# Patient Record
Sex: FEMALE | Race: BLACK OR AFRICAN AMERICAN | URBAN - METROPOLITAN AREA
[De-identification: names, ages, dates, MRNs, and addresses within clinical notes are randomized per-mention and may not be internally consistent; named-entity substitution may affect disease eponyms.]

---

## 2019-12-29 ENCOUNTER — EMERGENCY (EMERGENCY)
Facility: HOSPITAL | Age: 34
LOS: 0 days | Discharge: HOME | End: 2019-12-29
Admitting: EMERGENCY MEDICINE
Payer: OTHER MISCELLANEOUS

## 2019-12-29 VITALS
HEART RATE: 62 BPM | RESPIRATION RATE: 18 BRPM | OXYGEN SATURATION: 99 % | SYSTOLIC BLOOD PRESSURE: 136 MMHG | TEMPERATURE: 98 F | DIASTOLIC BLOOD PRESSURE: 60 MMHG
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
## 2019-12-29 DIAGNOSIS — S61.232A PUNCTURE WOUND WITHOUT FOREIGN BODY OF RIGHT MIDDLE FINGER WITHOUT DAMAGE TO NAIL, INITIAL ENCOUNTER: ICD-10-CM
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
## 2019-12-29 PROCEDURE — 99284 EMERGENCY DEPT VISIT MOD MDM: CPT

## 2019-12-29 NOTE — ED PROVIDER NOTE - PROGRESS NOTE DETAILS
Spoke to 3A charge RN notified medicine team to draw labs on source patient. Reviewed source patient medical record no hx HIV/ Hepatitis hx. Low risk no pep started. Follow-up employee health tomorrow.

## 2019-12-29 NOTE — ED PROVIDER NOTE - OBJECTIVE STATEMENT
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 He left the stylet on the table and I was planning to throw it out and I forgot it was on the table. I accidentally stuck my right pinky with it. I immediately washes my pinky. All my immunizations are UTD since I just started working here." no other complaints

## 2019-12-29 NOTE — ED PROVIDER NOTE - PATIENT PORTAL LINK FT
You can access the FollowMyHealth Patient Portal offered by [facility] by registering at the following website: [email or web address]. By joining [employer]’s FollowMyHealth portal, you will also be able to view your health information using other applications (apps) compatible with our system.

## 2019-12-29 NOTE — ED ADULT NURSE NOTE - NSIMPLEMENTINTERV_GEN_ALL_ED
Implemented All Universal Safety Interventions:  [place] to call system. Call bell, personal items and telephone within reach. Instruct patient to call for assistance. Room bathroom lighting operational. Non-slip footwear when patient is off stretcher. Physically safe environment: no spills, clutter or unnecessary equipment. Stretcher in lowest position, wheels locked, appropriate side rails in place.

## 2019-12-29 NOTE — ED PROVIDER NOTE - NSFOLLOWUPINSTRUCTIONS_ED_ALL_ED_FT
Body Fluid Exposure Information    People may come into contact with blood and other body fluids under various circumstances. In some cases, body fluids may contain germs (bacteria or viruses) that cause infections. These germs can be spread when an infected person’s body fluids come into contact with the mouth, nose, eyes, genitals, or broken skin of another person. Broken skin includes skin that has been opened by cuts, abrasions, dermatitis, or chapped skin.    Exposure to infected body fluids is a common risk for health care workers and family members who care for sick people. Other common methods of exposure include injection drug use, sharing needles, and sexual activity.    The risk of an infection spreading through body fluid exposure is small and depends on a variety of factors. These include the type of body fluid, the nature of the exposure, and the health status of the person who was the source of the body fluids. Your health care provider can help you assess the risk.    Prevention is the first defense against body fluid exposure.    What types of body fluids can spread infection?  The following body fluids have the potential to spread infections:    Blood.  Semen.  Vaginal secretions.  Urine.  Feces.  Saliva.  Nasal or eye discharge.  Breast milk.  Amniotic fluid.  Fluids surrounding body organs.    What are some first-aid measures for body fluid exposure?  The following steps should be taken as soon as possible after a person is exposed to body fluids:    Intact skin     For contact with closed skin, wash the area with soap and water.    Broken skin     For contact with broken skin:    Let the area bleed a little.  Wash well with soap and water. If soap is not available, use just water or hand .  Place a bandage or clean towel on the wound and apply gentle pressure to stop the bleeding. Do not squeeze or rub the area.    Do not use harsh chemicals such as bleach or iodine.    Eyes     Rinse the eyes with water or saline for 30 seconds or longer.  If the person is wearing contact lenses, leave the contact lenses in while rinsing the eyes. After the rinsing is complete, remove the contact lenses.    Mouth     Spit out the fluids. Rinse with water 4–5 times, spitting it out each time.    In addition, you should remove any clothing that comes into contact with body fluids. However, if you came into contact with the fluids as a result of sexual assault, seek medical care immediately. Do not shower, take a bath, or change clothes until police collect evidence of sexual assault from your body and your clothes.    When should I seek help?  After performing the proper first-aid steps:    You should call your health care provider or seek emergency care right away if blood or other body fluids made contact with areas of broken skin or openings such as the eyes, nose, or mouth.  If the exposure to body fluid happened in the workplace, you should report it to your work supervisor immediately. Many workplaces have procedures in place for exposure situations.    What will happen after I report the exposure?  Your health care provider will ask you several questions. Information requested may include:    Your medical history, including vaccination records.  Date and time of the exposure.  Whether you saw body fluids during the exposure.  Type of body fluid you were exposed to.  Volume of body fluid you were exposed to.  How the exposure happened.  If any devices, such as needles, were being used.  Which area of your body made contact with the body fluid.  Description of any injury to the skin or other area.  How long contact was made with the body fluid.  The health status of the person whose body fluid you were exposed to, if known.    Your health care provider will assess your risk for infection. Often, no treatment is necessary. However, in some cases:    Your health care provider may recommend doing blood tests right away.  Follow-up blood tests may also be done at certain intervals during the upcoming weeks and months to check for any changes.  You may be offered treatment to prevent an infection from developing after exposure (post-exposure prophylaxis). This may include certain vaccinations or medicines and may be necessary when there is a risk of a serious infection, such as HIV (human immunodeficiency virus) or hepatitis B. Your health care provider will discuss appropriate treatment and vaccinations with you.    How can I prevent exposure and infection?  To help prevent exposure to body fluids:    Wash and disinfect countertops and other surfaces regularly.  Wear appropriate protective gear such as gloves, gowns, masks, or eyewear when the possibility of exposure is present.  Wipe away spills of body fluid with disposable towels.  Properly dispose of blood products and other fluids. Use secured bags.  Properly dispose of needles and other instruments with sharp points or edges (sharps). Use closed, marked containers.  Avoid injection drug use.  Do not share needles.  Avoid recapping needles.  Use a condom during sexual intercourse.  Learn and follow any guidelines for preventing exposure (universal precautions) provided at your workplace.    To help reduce your chances of getting an infection:    Make sure your vaccinations are up-to-date, including vaccinations for tetanus and hepatitis.  Wash your hands frequently with soap and water. If water and soap are not available, use hand .  Avoid having multiple sexual partners.  Keep all follow-up visits as told by your health care provider. This is important because you will need to be monitored after you are evaluated for exposure to body fluids.    To avoid spreading infection to others:    Do not have sexual relations until you know you are free of infection.  Do not donate blood, plasma, breast milk, sperm, or other body fluids.  Do not share hygiene items such as toothbrushes, razors, or dental floss.  Keep open wounds covered.  Dispose of any items with blood on them (razors, tampons, bandages) by putting them in the trash.  Do not share drug supplies, such as needles, syringes, straws, or pipes, with others.  Follow all instructions from your health care provider for preventing the spread of infection.    Follow-up with employee health tomorrow.

## 2019-12-29 NOTE — ED ADULT NURSE NOTE - OBJECTIVE STATEMENT
pt her c/o needlestick. pt reports pt refused to use hosptial lancets. pt used his own and pca was going to bring to sharps and was stuck. washed immediately after.

## 2020-01-31 ENCOUNTER — EMERGENCY (EMERGENCY)
Facility: HOSPITAL | Age: 35
LOS: 0 days | Discharge: HOME | End: 2020-01-31
Attending: EMERGENCY MEDICINE | Admitting: EMERGENCY MEDICINE
Payer: MEDICAID

## 2020-01-31 VITALS
SYSTOLIC BLOOD PRESSURE: 148 MMHG | TEMPERATURE: 97 F | RESPIRATION RATE: 22 BRPM | OXYGEN SATURATION: 100 % | DIASTOLIC BLOOD PRESSURE: 83 MMHG | HEART RATE: 65 BPM

## 2020-01-31 VITALS
HEART RATE: 67 BPM | TEMPERATURE: 99 F | RESPIRATION RATE: 18 BRPM | DIASTOLIC BLOOD PRESSURE: 85 MMHG | SYSTOLIC BLOOD PRESSURE: 166 MMHG | OXYGEN SATURATION: 100 %

## 2020-01-31 DIAGNOSIS — R51 HEADACHE: ICD-10-CM

## 2020-01-31 DIAGNOSIS — R09.81 NASAL CONGESTION: ICD-10-CM

## 2020-01-31 LAB
ALBUMIN SERPL ELPH-MCNC: 4.6 G/DL — SIGNIFICANT CHANGE UP (ref 3.5–5.2)
ALP SERPL-CCNC: 48 U/L — SIGNIFICANT CHANGE UP (ref 30–115)
ALT FLD-CCNC: 10 U/L — SIGNIFICANT CHANGE UP (ref 0–41)
ANION GAP SERPL CALC-SCNC: 14 MMOL/L — SIGNIFICANT CHANGE UP (ref 7–14)
AST SERPL-CCNC: 26 U/L — SIGNIFICANT CHANGE UP (ref 0–41)
BASOPHILS # BLD AUTO: 0.02 K/UL — SIGNIFICANT CHANGE UP (ref 0–0.2)
BASOPHILS NFR BLD AUTO: 0.3 % — SIGNIFICANT CHANGE UP (ref 0–1)
BILIRUB SERPL-MCNC: 0.2 MG/DL — SIGNIFICANT CHANGE UP (ref 0.2–1.2)
BUN SERPL-MCNC: 10 MG/DL — SIGNIFICANT CHANGE UP (ref 10–20)
CALCIUM SERPL-MCNC: 9.4 MG/DL — SIGNIFICANT CHANGE UP (ref 8.5–10.1)
CHLORIDE SERPL-SCNC: 103 MMOL/L — SIGNIFICANT CHANGE UP (ref 98–110)
CO2 SERPL-SCNC: 21 MMOL/L — SIGNIFICANT CHANGE UP (ref 17–32)
CREAT SERPL-MCNC: 0.7 MG/DL — SIGNIFICANT CHANGE UP (ref 0.7–1.5)
EOSINOPHIL # BLD AUTO: 0.05 K/UL — SIGNIFICANT CHANGE UP (ref 0–0.7)
EOSINOPHIL NFR BLD AUTO: 0.8 % — SIGNIFICANT CHANGE UP (ref 0–8)
GLUCOSE SERPL-MCNC: 92 MG/DL — SIGNIFICANT CHANGE UP (ref 70–99)
HCT VFR BLD CALC: 33.4 % — LOW (ref 37–47)
HGB BLD-MCNC: 10.8 G/DL — LOW (ref 12–16)
IMM GRANULOCYTES NFR BLD AUTO: 0.2 % — SIGNIFICANT CHANGE UP (ref 0.1–0.3)
LYMPHOCYTES # BLD AUTO: 2.52 K/UL — SIGNIFICANT CHANGE UP (ref 1.2–3.4)
LYMPHOCYTES # BLD AUTO: 42 % — SIGNIFICANT CHANGE UP (ref 20.5–51.1)
MAGNESIUM SERPL-MCNC: 2.1 MG/DL — SIGNIFICANT CHANGE UP (ref 1.8–2.4)
MCHC RBC-ENTMCNC: 28.2 PG — SIGNIFICANT CHANGE UP (ref 27–31)
MCHC RBC-ENTMCNC: 32.3 G/DL — SIGNIFICANT CHANGE UP (ref 32–37)
MCV RBC AUTO: 87.2 FL — SIGNIFICANT CHANGE UP (ref 81–99)
MONOCYTES # BLD AUTO: 0.48 K/UL — SIGNIFICANT CHANGE UP (ref 0.1–0.6)
MONOCYTES NFR BLD AUTO: 8 % — SIGNIFICANT CHANGE UP (ref 1.7–9.3)
NEUTROPHILS # BLD AUTO: 2.92 K/UL — SIGNIFICANT CHANGE UP (ref 1.4–6.5)
NEUTROPHILS NFR BLD AUTO: 48.7 % — SIGNIFICANT CHANGE UP (ref 42.2–75.2)
NRBC # BLD: 0 /100 WBCS — SIGNIFICANT CHANGE UP (ref 0–0)
PLATELET # BLD AUTO: 279 K/UL — SIGNIFICANT CHANGE UP (ref 130–400)
POTASSIUM SERPL-MCNC: 4.6 MMOL/L — SIGNIFICANT CHANGE UP (ref 3.5–5)
POTASSIUM SERPL-SCNC: 4.6 MMOL/L — SIGNIFICANT CHANGE UP (ref 3.5–5)
PROT SERPL-MCNC: 7.5 G/DL — SIGNIFICANT CHANGE UP (ref 6–8)
RBC # BLD: 3.83 M/UL — LOW (ref 4.2–5.4)
RBC # FLD: 13 % — SIGNIFICANT CHANGE UP (ref 11.5–14.5)
SODIUM SERPL-SCNC: 138 MMOL/L — SIGNIFICANT CHANGE UP (ref 135–146)
TROPONIN T SERPL-MCNC: <0.01 NG/ML — SIGNIFICANT CHANGE UP
WBC # BLD: 6 K/UL — SIGNIFICANT CHANGE UP (ref 4.8–10.8)
WBC # FLD AUTO: 6 K/UL — SIGNIFICANT CHANGE UP (ref 4.8–10.8)

## 2020-01-31 PROCEDURE — 93010 ELECTROCARDIOGRAM REPORT: CPT

## 2020-01-31 PROCEDURE — 99285 EMERGENCY DEPT VISIT HI MDM: CPT

## 2020-01-31 PROCEDURE — 71046 X-RAY EXAM CHEST 2 VIEWS: CPT | Mod: 26

## 2020-01-31 RX ORDER — ACETAMINOPHEN 500 MG
975 TABLET ORAL ONCE
Refills: 0 | Status: COMPLETED | OUTPATIENT
Start: 2020-01-31 | End: 2020-01-31

## 2020-01-31 RX ORDER — METOCLOPRAMIDE HCL 10 MG
10 TABLET ORAL ONCE
Refills: 0 | Status: COMPLETED | OUTPATIENT
Start: 2020-01-31 | End: 2020-01-31

## 2020-01-31 RX ORDER — SODIUM CHLORIDE 9 MG/ML
1000 INJECTION, SOLUTION INTRAVENOUS ONCE
Refills: 0 | Status: COMPLETED | OUTPATIENT
Start: 2020-01-31 | End: 2020-01-31

## 2020-01-31 RX ADMIN — Medication 975 MILLIGRAM(S): at 16:35

## 2020-01-31 RX ADMIN — SODIUM CHLORIDE 1000 MILLILITER(S): 9 INJECTION, SOLUTION INTRAVENOUS at 17:34

## 2020-01-31 RX ADMIN — Medication 10 MILLIGRAM(S): at 17:00

## 2020-01-31 RX ADMIN — SODIUM CHLORIDE 2000 MILLILITER(S): 9 INJECTION, SOLUTION INTRAVENOUS at 16:35

## 2020-01-31 RX ADMIN — Medication 104 MILLIGRAM(S): at 16:35

## 2020-01-31 RX ADMIN — Medication 975 MILLIGRAM(S): at 17:00

## 2020-01-31 NOTE — ED PROVIDER NOTE - PATIENT PORTAL LINK FT
You can access the FollowMyHealth Patient Portal offered by [facility] by registering at the following website: [email or web address]. By joining [email or web address]’s FollowMyHealth portal, you will also be able to view your health information using other applications (apps) compatible with our system.

## 2020-01-31 NOTE — ED ADULT NURSE NOTE - OBJECTIVE STATEMENT
Patient c/o right sided head pain and SOB x 3 days. Patient states this morning pain increased. +Dizziness, chills, sensitivity to light. On assessment PERRL. Denies any n/v/f/d at this time. No obvious signs of distress noted. Pulse ox-100% on room air, B/L Lungs clear.

## 2020-01-31 NOTE — ED PROVIDER NOTE - OBJECTIVE STATEMENT
Patient is a 34 year old female with a non significant pmh with a cc of headache and SOB. She says that the headache has been happening for 2 weeks. It has recently gotten worse stating that it was a 6/10 on pain scale and recently become a 8/10 with sudden right sided facial pain. Pain is described as sharp and radiates down to her back. She uses motrin to alleviate the pain and only slightly makes it better. Bending over makes it worse.    She also describes SOB that started 3 days ago that is consistent and is progressivley getting worse. Only lifting things makes it worse and sleeping makes it better. Symptoms do not change with position. Denies recent travel, OCP use, or leg swelling. Patient is a 34 year old female with a non significant pmh with a cc of headache and SOB. She says that the headache has been happening for 2 weeks. It has recently gotten worse stating that it was a 6/10 on pain scale and recently become a 8/10 with sudden right sided facial pain. Pain is described as sharp and radiates down to her back. She uses motrin to alleviate the pain and only slightly makes it better. Bending over makes it worse.

## 2020-01-31 NOTE — ED PROVIDER NOTE - PHYSICAL EXAMINATION
CONST: well appearing for age  HEAD:  normocephalic, atraumatic  EYES:  conjunctivae without injection, drainage or discharge  CARDIAC:  regular rate and rhythm, normal S1 and S2, no murmurs, rubs or gallops  RESP:  respiratory rate and effort appear normal for age; lungs are clear to auscultation bilaterally; no rales or wheezes  ABDOMEN:  soft, nontender, nondistended, no masses, no organomegaly  LYMPHATICS:  no significant lymphadenopathy  MUSCULOSKELETAL/NEURO:  normal movement, normal tone  SKIN:  normal skin color for age and race, well-perfused; warm and dry

## 2020-01-31 NOTE — ED PROVIDER NOTE - NS ED ROS FT
Review of Systems:  •	CONSTITUTIONAL - No fever, No diaphoresis, No weight change  •	SKIN - No rash  •	EYES - No eye pain, No blurred vision  •	RESPIRATORY - hortness of breath, No cough  •	CARDIAC -No chest pain, No palpitations  •	GI - No abdominal pain, No nausea, No vomiting, No diarrhea, No constipation, No bright red blood per rectum or melena. No flank pain  •                 - No dysuria, frequency, hematuria.   •	ENDO - No polydypsia, No polyuria, No heat/cold intolerance  •	MUSCULOSKELETAL - No joint paint, No swelling, No back pain  •	NEUROLOGIC - No numbness, No focal weakness, No dizziness, headache  All other systems negative, unless specified in HPI Review of Systems:  •	CONSTITUTIONAL - No fever, No diaphoresis, No weight change  •	SKIN - No rash  •	EYES - No eye pain, No blurred vision  •	RESPIRATORY - shortness of breath, No cough  •	CARDIAC -No chest pain, No palpitations  •	GI - No abdominal pain, No nausea, No vomiting, No diarrhea, No constipation, No bright red blood per rectum or melena. No flank pain  •                 - No dysuria, frequency, hematuria.   •	ENDO - No polydypsia, No polyuria, No heat/cold intolerance  •	MUSCULOSKELETAL - No joint paint, No swelling, No back pain  •	NEUROLOGIC - No numbness, No focal weakness, No dizziness, headache  All other systems negative, unless specified in HPI

## 2020-01-31 NOTE — ED PROVIDER NOTE - PLAN OF CARE
Assessment:  Patient is a 34 year old female with a non significant PMH with a cc of headache and SOB.

## 2020-01-31 NOTE — ED PROVIDER NOTE - CARE PLAN
Assessment and plan of treatment:	Assessment:  Patient is a 34 year old female with a non significant PMH with a cc of headache and SOB.

## 2020-01-31 NOTE — ED ADULT NURSE NOTE - NSIMPLEMENTINTERV_GEN_ALL_ED
Implemented All Universal Safety Interventions:  [place] to call system. Call bell, personal items and telephone within reach. Instruct patient to call for assistance. Room bathroom lighting operational. Non-slip footwear when patient is off stretcher. Physically safe environment: no spills, clutter or unnecessary equipment. Stretcher in lowest position, wheels locked, appropriate side rails in place.

## 2020-01-31 NOTE — ED PROVIDER NOTE - CLINICAL SUMMARY MEDICAL DECISION MAKING FREE TEXT BOX
patient presents with headache, facial pain and nasal congestion. labs, ekg, cxr done. exam consistent with sinus infection. Patient feeling better after medications. Will discharge with antibiotics and ENT follow up. Return precautions discussed.

## 2020-01-31 NOTE — ED PROVIDER NOTE - NSFOLLOWUPINSTRUCTIONS_ED_ALL_ED_FT
Please follow up with your primary care physician and ENT in 1-2 days.     Headache    A headache is pain or discomfort felt around the head or neck area. The specific cause of a headache may not be found as there are many types including tension headaches, migraine headaches, and cluster headaches. Watch your condition for any changes. Things you can do to manage your pain include taking over the counter and prescription medications as instructed by your health care provider, lying down in a dark quiet room, limiting stress, getting regular sleep, and refraining from alcohol and tobacco products.    SEEK IMMEDIATE MEDICAL CARE IF YOU HAVE ANY OF THE FOLLOWING SYMPTOMS: fever, vomiting, stiff neck, loss of vision, problems with speech, muscle weakness, loss of balance, trouble walking, passing out, or confusion.

## 2020-01-31 NOTE — ED PROVIDER NOTE - CARE PROVIDER_API CALL
Juma Connell)  Otolaryngology  [address] [place] [address], [address] [place] [address]  Phone: [phone]  Fax: [phone]  Follow Up Time:

## 2020-01-31 NOTE — ED PROVIDER NOTE - ATTENDING CONTRIBUTION TO CARE
35 yo F no pmh presents with headache and shortness of breath. States that for the last 2 weeks she has been having intermittent headache, right side, assocaited to the front of her face. States that for the last 3 days she also reports some shortness of breath. no cp, no palpitations. no leg swelling or recent travel. no ocp use, no hx of smoking. Also having runny nose that started today. no fevers. Reports a URI 3 weeks ago that resolved.     CONSTITUTIONAL: Well-developed; well-nourished; in no acute distress.   SKIN: warm, dry  HEAD: Normocephalic; atraumatic.  EYES: PERRL, EOMI, no conjunctival erythema  ENT: + right sided maxillary facial tenderness. + nasal congestion.   NECK: Supple; non tender.  CARD: S1, S2 normal;  Regular rate and rhythm.   RESP: No wheezes, rales or rhonchi. speaking full sentences, no retractions. good air movement.   ABD: soft non tender, non distended, no rebound or guarding  EXT: Normal ROM.    LYMPH: No acute cervical adenopathy.  NEURO: Alert, oriented, grossly unremarkable.   PSYCH: Cooperative, appropriate.

## 2020-04-26 ENCOUNTER — MESSAGE (OUTPATIENT)
Age: 35
End: 2020-04-26

## 2020-11-09 PROBLEM — Z00.00 ENCOUNTER FOR PREVENTIVE HEALTH EXAMINATION: Status: ACTIVE | Noted: 2020-11-09

## 2021-02-16 ENCOUNTER — EMERGENCY (EMERGENCY)
Facility: HOSPITAL | Age: 36
LOS: 0 days | Discharge: HOME | End: 2021-02-16
Attending: EMERGENCY MEDICINE | Admitting: EMERGENCY MEDICINE
Payer: COMMERCIAL

## 2021-02-16 VITALS
HEIGHT: 65 IN | HEART RATE: 69 BPM | OXYGEN SATURATION: 99 % | SYSTOLIC BLOOD PRESSURE: 150 MMHG | DIASTOLIC BLOOD PRESSURE: 90 MMHG | WEIGHT: 175.05 LBS | RESPIRATION RATE: 18 BRPM | TEMPERATURE: 98 F
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
## 2021-02-16 DIAGNOSIS — S61.233A PUNCTURE WOUND WITHOUT FOREIGN BODY OF LEFT MIDDLE FINGER WITHOUT DAMAGE TO NAIL, INITIAL ENCOUNTER: ICD-10-CM
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
## 2021-02-16 PROCEDURE — 99284 EMERGENCY DEPT VISIT MOD MDM: CPT

## 2021-02-16 NOTE — ED ADULT TRIAGE NOTE - NS ED NURSE BANDS TYPE
Cj Melgar,  It doesn't look like Karyn has an aspirin prescribed or that it is intentionally not prescribed per her medication list. Would you like her to begin taking an daily aspirin or note that it is intentionally not prescribed on her med list?  Thanks! Debra Name band;

## 2021-02-16 NOTE — ED PROVIDER NOTE - PHYSICAL EXAMINATION
VITAL SIGNS: I have reviewed nursing notes and confirm.  CONSTITUTIONAL: Well-developed; well-nourished; in no acute distress.  SKIN: Skin exam is warm and dry, no acute rash. (+) small superficial puncture wound to left 3rd MCP  EXT: Normal ROM.   NEURO: Alert, oriented. Grossly unremarkable. No focal deficits.

## 2021-02-16 NOTE — ED PROVIDER NOTE - NSFOLLOWUPINSTRUCTIONS_ED_ALL_ED_FT
Needlestick and Sharps Injury  ImageA needlestick injury happens when a person gets poked (stuck) by a needle or sharp tool (sharps) that may have someone else's blood on it. A needlestick injury can happen to a health care worker, or to anyone who is exposed to needles. The injury may expose you to blood that carries infections such as:  Hepatitis B.  Hepatitis C.  Human immunodeficiency virus (HIV).  If you have a needle stick injury or think you may have been exposed to blood or body fluids:  Wash the injured area right away with soap and water.  Place a bandage or clean towel on the wound and apply gentle pressure to stop the bleeding. Do not squeeze or rub the area.  Notify a work place supervisor or doctor. Follow any procedures in your work place.  Tell your doctor if you are pregnant or breastfeeding.  Treatments may include:  Blood tests to make sure that you have no infection.  Tetanus shot.  Hepatitis B shot.  Medicines to stop or treat infection.  Treatment for the wound.  Follow these instructions at home:  Wound care     There are many ways to close and cover a wound. For example, a wound can be covered with sutures, skin glue, or adhesive strips. Follow instructions from your doctor about:  How to take care of your wound.  When and how you should change your bandage (dressing).  Keep the bandage dry as told by your doctor.   Do not take baths, swim, use a hot tub, or do anything that would put your wound underwater until your doctor approves.  Check your wound every day for signs of infection. Check for:  Redness, swelling, or pain.  Fluid or blood.  Pus or a bad smell.  Warmth.  General instructions     Take over-the-counter and prescription medicines only as told by your doctor.  If you were prescribed an antibiotic medicine, take it as told by your doctor. Do not stop using the antibiotic even if you start to feel better.  Keep all follow-up visits as told by your doctor. This is important.  Contact a doctor if:  The poked area is red, swollen, or painful.  You have a fever.  You feel worried (anxious), mad, or sad (depressed).  You have trouble sleeping.  Your skin or the whites of your eyes look yellow (jaundice).  You have belly pain or a feeling of fullness.  You have tiredness (fatigue).  You feel sickness in a lot of your body (malaise).  You get infections often.  Summary  A needlestick injury happens when a person gets poked (stuck) by a needle that may have someone else's blood on it.  It is treated by cleaning the injured area right away with soap and water. You may get tetanus and hepatitis B shots. You may also get medicines for infections.  Take medicine and care for your wound as told by your doctor.  This information is not intended to replace advice given to you by your health care provider. Make sure you discuss any questions you have with your health care provider. Needlestick and Sharps Injury    A needlestick injury happens when a person gets poked (stuck) by a needle or sharp tool (sharps) that may have someone else's blood on it. A needlestick injury can happen to a health care worker, or to anyone who is exposed to needles. The injury may expose you to blood that carries infections such as:  Hepatitis B.  Hepatitis C.  Human immunodeficiency virus (HIV).  If you have a needle stick injury or think you may have been exposed to blood or body fluids:  Wash the injured area right away with soap and water.  Place a bandage or clean towel on the wound and apply gentle pressure to stop the bleeding. Do not squeeze or rub the area.  Notify a work place supervisor or doctor. Follow any procedures in your work place.  Tell your doctor if you are pregnant or breastfeeding.  Treatments may include:  Blood tests to make sure that you have no infection.  Tetanus shot.  Hepatitis B shot.  Medicines to stop or treat infection.  Treatment for the wound.  Follow these instructions at home:  Wound care     There are many ways to close and cover a wound. For example, a wound can be covered with sutures, skin glue, or adhesive strips. Follow instructions from your doctor about:  How to take care of your wound.  When and how you should change your bandage (dressing).  Keep the bandage dry as told by your doctor.   Do not take baths, swim, use a hot tub, or do anything that would put your wound underwater until your doctor approves.  Check your wound every day for signs of infection. Check for:  Redness, swelling, or pain.  Fluid or blood.  Pus or a bad smell.  Warmth.  General instructions     Take over-the-counter and prescription medicines only as told by your doctor.  If you were prescribed an antibiotic medicine, take it as told by your doctor. Do not stop using the antibiotic even if you start to feel better.  Keep all follow-up visits as told by your doctor. This is important.  Contact a doctor if:  The poked area is red, swollen, or painful.  You have a fever.  You feel worried (anxious), mad, or sad (depressed).  You have trouble sleeping.  Your skin or the whites of your eyes look yellow (jaundice).  You have belly pain or a feeling of fullness.  You have tiredness (fatigue).  You feel sickness in a lot of your body (malaise).  You get infections often.  Summary  A needlestick injury happens when a person gets poked (stuck) by a needle that may have someone else's blood on it.  It is treated by cleaning the injured area right away with soap and water. You may get tetanus and hepatitis B shots. You may also get medicines for infections.  Take medicine and care for your wound as told by your doctor.  This information is not intended to replace advice given to you by your health care provider. Make sure you discuss any questions you have with your health care provider.

## 2021-02-16 NOTE — ED PROVIDER NOTE - PATIENT PORTAL LINK FT
You can access the FollowMyHealth Patient Portal offered by [facility] by registering at the following website: [email or web address]. By joining [employer]’s FollowMyHealth portal, you will also be able to view your health information using other applications (apps) compatible with our system.

## 2021-02-16 NOTE — ED PROVIDER NOTE - OBJECTIVE STATEMENT
36 yo F with no significant PMHx presents to the ED c/o needle stick injury to left 3rd finger. 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 Pt was agitated and IV needle punctured him and then stuck pt. Pt denies other areas of injury. She washed area immediately. She denies numbness, weakness.

## 2021-02-16 NOTE — ED ADULT NURSE NOTE - OBJECTIVE STATEMENT
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  employee exposure kit completed; labs sent

## 2021-02-16 NOTE — ED PROVIDER NOTE - CLINICAL SUMMARY MEDICAL DECISION MAKING FREE TEXT BOX
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 Immediately washed and milked the digit.     Patient declined ppx pending contact patient's lab results. Baseline labs sent, will dc home, advised to follow up with manager and in employee health.

## 2021-03-09 ENCOUNTER — RESULT REVIEW (OUTPATIENT)
Age: 36
End: 2021-03-09

## 2021-05-24 ENCOUNTER — OUTPATIENT (OUTPATIENT)
Dept: OUTPATIENT SERVICES | Facility: HOSPITAL | Age: 36
LOS: 1 days | Discharge: HOME | End: 2021-05-24
Payer: COMMERCIAL

## 2021-05-24 DIAGNOSIS — R92.8 OTHER ABNORMAL AND INCONCLUSIVE FINDINGS ON DIAGNOSTIC IMAGING OF BREAST: ICD-10-CM

## 2021-05-24 PROCEDURE — 77062 BREAST TOMOSYNTHESIS BI: CPT | Mod: 26

## 2021-05-24 PROCEDURE — 76641 ULTRASOUND BREAST COMPLETE: CPT | Mod: 26,50

## 2021-05-24 PROCEDURE — 77066 DX MAMMO INCL CAD BI: CPT | Mod: 26

## 2021-06-10 ENCOUNTER — RESULT REVIEW (OUTPATIENT)
Age: 36
End: 2021-06-10

## 2021-06-10 ENCOUNTER — OUTPATIENT (OUTPATIENT)
Dept: OUTPATIENT SERVICES | Facility: HOSPITAL | Age: 36
LOS: 1 days | Discharge: HOME | End: 2021-06-10
Payer: COMMERCIAL

## 2021-06-10 PROCEDURE — 19083 BX BREAST 1ST LESION US IMAG: CPT | Mod: RT

## 2021-06-10 PROCEDURE — 77065 DX MAMMO INCL CAD UNI: CPT | Mod: 26,RT

## 2021-06-10 PROCEDURE — 88305 TISSUE EXAM BY PATHOLOGIST: CPT | Mod: 26

## 2021-06-11 LAB — SURGICAL PATHOLOGY STUDY: SIGNIFICANT CHANGE UP

## 2021-06-16 DIAGNOSIS — N63.10 UNSPECIFIED LUMP IN THE RIGHT BREAST, UNSPECIFIED QUADRANT: ICD-10-CM

## 2021-06-16 DIAGNOSIS — D24.1 BENIGN NEOPLASM OF RIGHT BREAST: ICD-10-CM

## 2021-07-14 ENCOUNTER — APPOINTMENT (OUTPATIENT)
Dept: BREAST CENTER | Facility: CLINIC | Age: 36
End: 2021-07-14
Payer: COMMERCIAL

## 2021-07-14 VITALS
HEIGHT: 65 IN | TEMPERATURE: 98.7 F | BODY MASS INDEX: 30.82 KG/M2 | WEIGHT: 185 LBS | SYSTOLIC BLOOD PRESSURE: 124 MMHG | DIASTOLIC BLOOD PRESSURE: 80 MMHG

## 2021-07-14 DIAGNOSIS — Z78.9 OTHER SPECIFIED HEALTH STATUS: ICD-10-CM

## 2021-07-14 PROCEDURE — 99072 ADDL SUPL MATRL&STAF TM PHE: CPT

## 2021-07-14 PROCEDURE — 99203 OFFICE O/P NEW LOW 30 MIN: CPT

## 2021-07-15 PROBLEM — Z78.9 NO PERTINENT PAST SURGICAL HISTORY: Status: RESOLVED | Noted: 2021-07-15 | Resolved: 2021-07-15

## 2021-07-15 PROBLEM — Z78.9 NO PERTINENT PAST MEDICAL HISTORY: Status: RESOLVED | Noted: 2021-07-15 | Resolved: 2021-07-15

## 2021-07-15 RX ORDER — ERGOCALCIFEROL (VITAMIN D2)
POWDER (GRAM) MISCELLANEOUS
Refills: 0 | Status: ACTIVE | COMMUNITY

## 2021-07-15 NOTE — PHYSICAL EXAM
[Normocephalic] : normocephalic [Atraumatic] : atraumatic [EOMI] : extra ocular movement intact [No Supraclavicular Adenopathy] : no supraclavicular adenopathy [No Cervical Adenopathy] : no cervical adenopathy [Examined in the supine and seated position] : examined in the supine and seated position [Symmetrical] : symmetrical [No dominant masses] : no dominant masses left breast [No Nipple Retraction] : no left nipple retraction [No Nipple Discharge] : no left nipple discharge [No Axillary Lymphadenopathy] : no left axillary lymphadenopathy [Soft] : abdomen soft [Not Tender] : non-tender [No Edema] : no edema [No Rashes] : no rashes [No Ulceration] : no ulceration [de-identified] : @12, retroareolar area, she has a 2 cm mobile rubbery mass; no other suspicious mass palpated  [de-identified] : no suspicious mass palpated

## 2021-07-15 NOTE — REVIEW OF SYSTEMS
[As Noted in HPI] : as noted in HPI [Breast Pain] : breast pain [Breast Lump] : breast lump [Negative] : Heme/Lymph [Fever] : no fever [Chills] : no chills [Skin Lesions] : no skin lesions [Skin Wound] : no skin wound

## 2021-07-15 NOTE — ASSESSMENT
[FreeTextEntry1] : Yane is a 36 F who has a painful right breast fibroadenoma. \par \par On exam, in her left breast, @12, retroareolar area, she has a 2 cm mobile rubbery mass; no other suspicious mass palpated within either breast. \par \par Her most recent imaging was a b/l dx mammogram and US on 5/24/2021 which revealed two right breast masses: -@8N3, stable benign oval circumscribed hypoechoic mass, measures 1.5 x 0.7 x 0.4 cm, @12, periareolar area, lobulated hypoechoic mass, measures 2.4 x 1.4 x 0.9 cm --> biopsy proven fibroadenoma, and b/l breast cysts. \par \par We discussed fibroadenomas.  These are benign lesions without any malignant potential.  They are hormonally influenced and can increase or decrease in size and can also regress spontaneously.  They are considered proliferative lesions without atypia.  Patients with these lesions have been found to have a slightly increased relative risk of breast cancer compared to the reference population.  Surgical excision is recommended when the diagnosis in unclear, the lesion is causing pain or breast deformity, or if it is rapidly enlarging. \par \par The reason that we recommend surgical excision for a rapidly enlarging fibroadenoma is because there is a possibility that this could be a Phyllodes Tumor.  The treatment of this lesion is wide local excision with 1 cm margins.  A sentinel lymph node would not be necessary as this disease very rarely spreads to the lymph nodes. \par \par Because she is having pain, I have recommended surgical excision of her right breast mass.  This is readily palpable, but I would still like this to be localized preoperatively. \par \par The risks and benefits of the procedure were explained including bleeding, infection, seroma or hematoma formation and possible reoperation.\par \par She is otherwise at an average risk for breast cancer and should start annual screening mammograms at the age of 40. \par \par All of her questions were answered.  She knows to call with any further questions or concerns. \par \par PLAN: \par -OR: RIGHT BREAST WIDE LOCAL EXCISION WITH RF LOCALIZATION \par -DIAGNOSIS: RIGHT BREAST FIBROADENOMA \par -f/up after

## 2021-07-15 NOTE — HISTORY OF PRESENT ILLNESS
[FreeTextEntry1] : Yane is a 36 F with a symptomatic R breast fibroadenoma. \par \par She has always had a R breast mass since 5 years prior, but starting 2 years prior, she has had worsening pain in that area. \par \par She has not palpated any abnormalities within her left breast and otherwise has cyclical breast pains. SHe denies any nipple discharge or retraction. \par \par Her work up was as follows: \par 2021 -- b/l dx mammogram and US \par -heterogenously dense breasts\par -no suspicious mass, microcalcifications or architectural distortion b/l \par b/l US \par RIGHT: \par -@8N3, stable benign oval circumscribed hypoechoic mass, measures 1.5 x 0.7 x 0.4 cm \par -@12N3-4, cysts, largest is 2.1 cm \par -@12, periareolar area, lobulated hypoechoic mass, measures 2.4 x 1.4 x 0.9 cm --> BIOPSY \par LEFT: \par -LOQ cyst, measures 0.9 cm \par -retroareolar cyst, measures 0.9 cm \par BIRADS 4\par \par 6/10/2021 -- R US CNBx @12:00, periareolar area \par -fibroadenoma, partially hyalnizing \par \par HISTORICAL RISK FACTORS: \par -no prior breast biopsies or surgeries \par -no family history of breast or ovarian cancer \par -, age at first live birth was 27 \par -no prior OCP use \par -no gyn surgeries\par

## 2021-07-15 NOTE — PAST MEDICAL HISTORY
[Menstruating] : The patient is menstruating [Menarche Age ____] : age at menarche was [unfilled] [History of Hormone Replacement Treatment] : has no history of hormone replacement treatment [Definite ___ (Date)] : the last menstrual period was [unfilled] [Normal Amount/Duration] : it was of a normal amount and duration [Regular Cycle Intervals] : have been regular [Total Preg ___] : G[unfilled] [Live Births ___] : P[unfilled]  [Age At Live Birth ___] : Age at live birth: [unfilled] [FreeTextEntry5] : denies  [FreeTextEntry6] : denies [FreeTextEntry7] : denies [FreeTextEntry8] : yes x 1 year

## 2021-07-15 NOTE — DATA REVIEWED
[FreeTextEntry1] : EXAM:  US BREAST COMPLETE BI\par EXAM: MG MAMMO DIAG W CARMELA BI#\par \par \par PROCEDURE DATE: 05/24/2021\par \par \par \par INTERPRETATION: Clinical History / Reason for exam: Patient presents with diffuse right breast pain. Patient also presents with a palpable abnormality in the superior aspect of the right breast which as been present for over 5 years.\par \par The patient reports her last clinical breast examination was performed 2 months ago.\par \par Diagnostic bilateral mammography including tomosynthesis was performed and submitted for evaluation.\par \par Computer-aided detection was utilized in the interpretation of this examination.\par \par No old films are available for comparison as this is the patient's baseline study.\par \par Breast composition:The breasts are heterogeneously dense, which may obscure small masses.\par \par There are no suspicious masses, areas of architectural distortion or cluster of microcalcifications in either breast.\par \par Whole Bilateral Breast Sonogram:\par \par RIGHT BREAST:\par \par Incidentally noted at the 8:00 location 3 cm from the nipple is a stable benign oval circumscribed hypoechoic mass measuring 1.5 cm x 0.7 cm x 0.4 cm.\par \par In the region of the patient's right breast palpable abnormality at the 12:00 location 3 to 4 cm from the nipple are a number of incidentally noted cysts with the largest measuring up to 2.1 cm. There is also an incidentally noted oval lobulated hypoechoic mass at the 12:00 location periareolar region measuring 2.4 cm x 1.4 cm x 0.9 cm. An ultrasound guided core biopsy is recommended.\par \par LEFT BREAST:\par \par Cyst in the lower outer quadrant of the left breast measuring 0.9 cm and\par \par retroareolar region measuring 0.9 cm.\par \par Evaluation of both axillary regions images normal-appearing lymph nodes.\par \par IMPRESSION: Incidentally noted in the region of the patient's right breast palpable abnormality is an oval lobulated hypoechoic mass at the 12:00 location periareolar region measuring 2.4 cm x 1.4 cm x 0.9 cm. An ultrasound guided core biopsy is recommended.\par \par Incidentally noted cysts in the region of the patient's right breast palpable abnormality at the 12:00 location 3 to 4 cm from the nipple with the largest measuring up to 2.1 cm.\par \par Incidentally noted stable benign oval circumscribed hypoechoic right breast mass at the 8:00 location 3 cm from the nipple as above.\par \par Incidentally noted subcentimeter left breast cysts as above.\par \par Recommendation: Ultrasound guided biopsy.\par \par BI-RADS Category 4: Suspicious\par \par \par \par \par CLEMENTINA BEYER MD; Attending Radiologist\par This document has been electronically signed. May 24 2021 12:35PM\par \par EXAM: MG MAMMO DIAG RT\par EXAM: MG US BX BRST 1ST RT SISC\par \par *** ADDENDUM 06/11/2021 ***\par \par Postprocedure patient follow-up and Pathology result:\par \par -Diagnosis:\par BREAST, RIGHT 12:00 MASS, ULTRASOUND GUIDED NEEDLE CORE BIOPSIES:\par - FIBROADENOMA, PARTIALLY HYALINIZING.\par -The pathology results are benign concordant with the imaging findings.\par \par -Recommendation: Surgical consultation as patient desires surgical excision (pain).\par \par -No significant delayed complications.\par \par -Results were discussed with patient on 6/15/2021 at 8:55 AM by Dr. Berry via telephone with read back.\par \par \par \par *** END OF ADDENDUM 06/11/2021 ***\par \par \par \par PROCEDURE DATE: 06/10/2021\par \par \par \par INTERPRETATION: Clinical History / Reason for exam: Right breast mass.\par \par PROCEDURES: right breast breast ultrasound guided spring loaded core biopsy and post clip placement two view right breast mammogram.\par \par TECHNIQUE: Previous films and reports were reviewed. The procedure, its risks, benefits, and alternatives were explained to the patient, who expressed understanding and gave informed written and verbal consent. A time-out, which included the patient's full name, date of birth, description of the expected procedure and procedure site, was performed immediately before the procedure.\par \par Using the usual sterile technique and ultrasound guidance, the previously described mass in the right breast 12:00 periareolar was biopsied utilizing a 14-gauge automated Bard core biopsy device with a 13-gauge introducer sheath. 5 samples were collected. A marking clip (Subway top-hat) was deployed under ultrasound guidance. Hemostasis was obtained and a sterile dressing was applied.\par \par A right mammogram was performed:\par VIEWS: CC and ML views of the right breast.\par BREAST COMPOSITION: The breasts are heterogeneously dense, which may obscure small masses.\par FINDINGS: The biopsy clip placed during the ultrasound guided biopsy is in the anticipated location in the right breast.\par FINAL ASSESSMENT Category: Post Procedure Mammogram for Marker Placement\par \par The patient tolerated the procedure well and left the department in good condition with written discharge instructions.\par \par \par IMPRESSION:\par \par Successful ultrasound guided core biopsy of the right breast.\par \par Pathology: Pending, to be reported as an addendum.\par \par ***Please see the addendum at the top of this report. It may contain additional important information or changes.****\par \par \par \par PHUONG BERRY DO; Attending Radiologist\par This document has been electronically signed. Gualberto 10 2021 3:53PM\par Addend:PHUONG BERRY DO; Attending Radiologist\par This addendum was electronically signed on: Gualberto 15 2021 9:01AM

## 2021-07-16 ENCOUNTER — NON-APPOINTMENT (OUTPATIENT)
Age: 36
End: 2021-07-16

## 2021-07-29 ENCOUNTER — OUTPATIENT (OUTPATIENT)
Dept: OUTPATIENT SERVICES | Facility: HOSPITAL | Age: 36
LOS: 1 days | Discharge: HOME | End: 2021-07-29
Payer: COMMERCIAL

## 2021-07-29 VITALS
OXYGEN SATURATION: 98 % | RESPIRATION RATE: 18 BRPM | DIASTOLIC BLOOD PRESSURE: 96 MMHG | SYSTOLIC BLOOD PRESSURE: 138 MMHG | HEIGHT: 65 IN | WEIGHT: 196.21 LBS | TEMPERATURE: 98 F | HEART RATE: 85 BPM

## 2021-07-29 DIAGNOSIS — Z01.818 ENCOUNTER FOR OTHER PREPROCEDURAL EXAMINATION: ICD-10-CM

## 2021-07-29 DIAGNOSIS — D24.1 BENIGN NEOPLASM OF RIGHT BREAST: ICD-10-CM

## 2021-07-29 LAB
ALBUMIN SERPL ELPH-MCNC: 4.7 G/DL — SIGNIFICANT CHANGE UP (ref 3.5–5.2)
ALP SERPL-CCNC: 60 U/L — SIGNIFICANT CHANGE UP (ref 30–115)
ALT FLD-CCNC: 10 U/L — SIGNIFICANT CHANGE UP (ref 0–41)
ANION GAP SERPL CALC-SCNC: 8 MMOL/L — SIGNIFICANT CHANGE UP (ref 7–14)
APTT BLD: 37 SEC — SIGNIFICANT CHANGE UP (ref 27–39.2)
AST SERPL-CCNC: 16 U/L — SIGNIFICANT CHANGE UP (ref 0–41)
BASOPHILS # BLD AUTO: 0.01 K/UL — SIGNIFICANT CHANGE UP (ref 0–0.2)
BASOPHILS NFR BLD AUTO: 0.2 % — SIGNIFICANT CHANGE UP (ref 0–1)
BILIRUB SERPL-MCNC: 0.2 MG/DL — SIGNIFICANT CHANGE UP (ref 0.2–1.2)
BUN SERPL-MCNC: 13 MG/DL — SIGNIFICANT CHANGE UP (ref 10–20)
CALCIUM SERPL-MCNC: 9.3 MG/DL — SIGNIFICANT CHANGE UP (ref 8.5–10.1)
CHLORIDE SERPL-SCNC: 103 MMOL/L — SIGNIFICANT CHANGE UP (ref 98–110)
CO2 SERPL-SCNC: 25 MMOL/L — SIGNIFICANT CHANGE UP (ref 17–32)
CREAT SERPL-MCNC: 0.7 MG/DL — SIGNIFICANT CHANGE UP (ref 0.7–1.5)
EOSINOPHIL # BLD AUTO: 0.07 K/UL — SIGNIFICANT CHANGE UP (ref 0–0.7)
EOSINOPHIL NFR BLD AUTO: 1.3 % — SIGNIFICANT CHANGE UP (ref 0–8)
GLUCOSE SERPL-MCNC: 81 MG/DL — SIGNIFICANT CHANGE UP (ref 70–99)
HCT VFR BLD CALC: 33.7 % — LOW (ref 37–47)
HGB BLD-MCNC: 10.5 G/DL — LOW (ref 12–16)
IMM GRANULOCYTES NFR BLD AUTO: 0.2 % — SIGNIFICANT CHANGE UP (ref 0.1–0.3)
INR BLD: 0.97 RATIO — SIGNIFICANT CHANGE UP (ref 0.65–1.3)
LYMPHOCYTES # BLD AUTO: 2.6 K/UL — SIGNIFICANT CHANGE UP (ref 1.2–3.4)
LYMPHOCYTES # BLD AUTO: 46.8 % — SIGNIFICANT CHANGE UP (ref 20.5–51.1)
MCHC RBC-ENTMCNC: 27.6 PG — SIGNIFICANT CHANGE UP (ref 27–31)
MCHC RBC-ENTMCNC: 31.2 G/DL — LOW (ref 32–37)
MCV RBC AUTO: 88.7 FL — SIGNIFICANT CHANGE UP (ref 81–99)
MONOCYTES # BLD AUTO: 0.61 K/UL — HIGH (ref 0.1–0.6)
MONOCYTES NFR BLD AUTO: 11 % — HIGH (ref 1.7–9.3)
NEUTROPHILS # BLD AUTO: 2.25 K/UL — SIGNIFICANT CHANGE UP (ref 1.4–6.5)
NEUTROPHILS NFR BLD AUTO: 40.5 % — LOW (ref 42.2–75.2)
NRBC # BLD: 0 /100 WBCS — SIGNIFICANT CHANGE UP (ref 0–0)
PLATELET # BLD AUTO: 284 K/UL — SIGNIFICANT CHANGE UP (ref 130–400)
POTASSIUM SERPL-MCNC: 4.3 MMOL/L — SIGNIFICANT CHANGE UP (ref 3.5–5)
POTASSIUM SERPL-SCNC: 4.3 MMOL/L — SIGNIFICANT CHANGE UP (ref 3.5–5)
PROT SERPL-MCNC: 7.3 G/DL — SIGNIFICANT CHANGE UP (ref 6–8)
PROTHROM AB SERPL-ACNC: 11.1 SEC — SIGNIFICANT CHANGE UP (ref 9.95–12.87)
RBC # BLD: 3.8 M/UL — LOW (ref 4.2–5.4)
RBC # FLD: 13.2 % — SIGNIFICANT CHANGE UP (ref 11.5–14.5)
SODIUM SERPL-SCNC: 136 MMOL/L — SIGNIFICANT CHANGE UP (ref 135–146)
WBC # BLD: 5.55 K/UL — SIGNIFICANT CHANGE UP (ref 4.8–10.8)
WBC # FLD AUTO: 5.55 K/UL — SIGNIFICANT CHANGE UP (ref 4.8–10.8)

## 2021-07-29 PROCEDURE — 93010 ELECTROCARDIOGRAM REPORT: CPT

## 2021-07-29 NOTE — H&P PST ADULT - HISTORY OF PRESENT ILLNESS
Ultra sound core biopsy  Final Report on 06/11/2021           Final Diagnosis  Breast, right 12:00 mass, ultrasound guided needle core biopsies:  - Fibroadenoma, partially hyalinizing Yane Pickett is a 37 yo f with PMH of anemia, HTN ( no meds) presents to PAST for the above procedure due to right breast mass and Ultra sound core biopsy  Final Report on 06/11/2021   shown:   Final Diagnosis  Breast, right 12:00 mass, ultrasound guided needle core biopsies:  - Fibroadenoma, partially hyalinizing  Patient denies any c/o cp, sob, palpitations fever, cough or dysuria. Ex tolerance of 3 fos walks with out SOB.   Patient denies any s/s covid 19 and reports no contact with known positive people. Patient has appointment for repeat covid testing pre op and instructed to continue to self monitor and report any concerns to MD. Pt will continue to practice self isolation and  exposure control measures pre op   Patient doesn't take Covid vaccine and claimed that she had the antibodies.   Anesthesia Alert  Class IV-Difficult Airway  NO--History of neck surgery or radiation  NO--Limited ROM of neck  NO--History of Malignant hyperthermia  NO--Personal or family history of Pseudocholinesterase deficiency  NO--Prior Anesthesia Complication  NO--Latex Allergy  NO--Loose teeth  NO--History of Rheumatoid Arthritis  NO--QUINN  No bleeding Risk. H/O Anemia  BP high on PAST visit and claimed that she was anxious about her mother before her visit. Instructed to Rept BP at work/ Home ( [employer] employee)  and if it is high she need to be see her PMD. Pt verbalized understandings.  BP A 150/94 Manual   /94 Manual    Yane Pickett is a 35 yo f with PMH of anemia, HTN ( no meds) presents to PAST for the above procedure due to right breast mass and Ultra sound core biopsy  Final Report on 06/11/2021   shown:   Final Diagnosis  Breast, right 12:00 mass, ultrasound guided needle core biopsies:  - Fibroadenoma, partially hyalinizing  Patient denies any c/o cp, sob, palpitations fever, cough or dysuria. Ex tolerance of 3 fos walks with out SOB.   Patient denies any s/s covid 19 and reports no contact with known positive people. Patient has appointment for repeat covid testing pre op and instructed to continue to self monitor and report any concerns to MD. Pt will continue to practice self isolation and  exposure control measures pre op   Patient doesn't take Covid vaccine and claimed that she had the antibodies.   Anesthesia Alert  Class IV-Difficult Airway  NO--History of neck surgery or radiation  NO--Limited ROM of neck  NO--History of Malignant hyperthermia  NO--Personal or family history of Pseudocholinesterase deficiency  NO--Prior Anesthesia Complication  NO--Latex Allergy  NO--Loose teeth  NO--History of Rheumatoid Arthritis  NO--QUINN  No bleeding Risk. H/O Anemia  BP high at PAST visit and claimed that she was anxious about her mother before her visit. Instructed to Rept BP at work/ Home ( [employer] employee)  and if it is high she need to be see her PMD. Pt verbalized understandings.  BP A 150/94 Manual   /94 Manual

## 2021-07-29 NOTE — H&P PST ADULT - HEIGHT IN CM
[] :  [[employer] ] : [employer]   [Mother] : mother [FreeTextEntry1] : [id number] [FreeTextEntry2] : Farzana 165.1

## 2021-07-29 NOTE — H&P PST ADULT - NSANTHOSAYNRD_GEN_A_CORE
No. QUINN screening performed.  STOP BANG Legend: 0-2 = LOW Risk; 3-4 = INTERMEDIATE Risk; 5-8 = HIGH Risk

## 2021-07-29 NOTE — H&P PST ADULT - REASON FOR ADMISSION
Case Type: OP Block Time Suite: [facility]  Proceduralist: Sara Yeon Kim  Confirmed Surgery DateTime: 08- -   Procedure: RIGHT BREAST WIDE LOCAL EXCISION WITH RADIOFREQUENCY LOCALIZER  Laterality: Right   Length of Procedure: 90 Minutes  Anesthesia Type: Local Standby

## 2021-08-03 ENCOUNTER — NON-APPOINTMENT (OUTPATIENT)
Age: 36
End: 2021-08-03

## 2021-08-03 PROBLEM — I10 ESSENTIAL (PRIMARY) HYPERTENSION: Chronic | Status: ACTIVE | Noted: 2021-07-29

## 2021-08-03 PROBLEM — D64.9 ANEMIA, UNSPECIFIED: Chronic | Status: ACTIVE | Noted: 2021-07-29

## 2021-08-16 ENCOUNTER — LABORATORY RESULT (OUTPATIENT)
Age: 36
End: 2021-08-16

## 2021-08-16 ENCOUNTER — OUTPATIENT (OUTPATIENT)
Dept: OUTPATIENT SERVICES | Facility: HOSPITAL | Age: 36
LOS: 1 days | Discharge: HOME | End: 2021-08-16

## 2021-08-16 DIAGNOSIS — Z11.59 ENCOUNTER FOR SCREENING FOR OTHER VIRAL DISEASES: ICD-10-CM

## 2021-08-17 ENCOUNTER — OUTPATIENT (OUTPATIENT)
Dept: OUTPATIENT SERVICES | Facility: HOSPITAL | Age: 36
LOS: 1 days | Discharge: HOME | End: 2021-08-17
Payer: COMMERCIAL

## 2021-08-17 ENCOUNTER — RESULT REVIEW (OUTPATIENT)
Age: 36
End: 2021-08-17

## 2021-08-17 PROCEDURE — 19281 PERQ DEVICE BREAST 1ST IMAG: CPT | Mod: RT

## 2021-08-19 ENCOUNTER — OUTPATIENT (OUTPATIENT)
Dept: OUTPATIENT SERVICES | Facility: HOSPITAL | Age: 36
LOS: 1 days | Discharge: HOME | End: 2021-08-19
Payer: COMMERCIAL

## 2021-08-19 ENCOUNTER — RESULT REVIEW (OUTPATIENT)
Age: 36
End: 2021-08-19

## 2021-08-19 ENCOUNTER — APPOINTMENT (OUTPATIENT)
Dept: BREAST CENTER | Facility: AMBULATORY SURGERY CENTER | Age: 36
End: 2021-08-19
Payer: COMMERCIAL

## 2021-08-19 VITALS
RESPIRATION RATE: 18 BRPM | WEIGHT: 196.21 LBS | HEIGHT: 65 IN | TEMPERATURE: 99 F | HEART RATE: 65 BPM | SYSTOLIC BLOOD PRESSURE: 151 MMHG | DIASTOLIC BLOOD PRESSURE: 92 MMHG

## 2021-08-19 VITALS
DIASTOLIC BLOOD PRESSURE: 81 MMHG | RESPIRATION RATE: 15 BRPM | OXYGEN SATURATION: 100 % | HEART RATE: 65 BPM | SYSTOLIC BLOOD PRESSURE: 127 MMHG

## 2021-08-19 PROCEDURE — 19125 EXCISION BREAST LESION: CPT | Mod: RT

## 2021-08-19 PROCEDURE — 88305 TISSUE EXAM BY PATHOLOGIST: CPT | Mod: 26

## 2021-08-19 RX ORDER — HYDROMORPHONE HYDROCHLORIDE 2 MG/ML
0.2 INJECTION INTRAMUSCULAR; INTRAVENOUS; SUBCUTANEOUS
Refills: 0 | Status: DISCONTINUED | OUTPATIENT
Start: 2021-08-19 | End: 2021-08-19

## 2021-08-19 RX ORDER — FERROUS SULFATE 325(65) MG
1 TABLET ORAL
Qty: 0 | Refills: 0 | DISCHARGE

## 2021-08-19 RX ORDER — ACETAMINOPHEN 500 MG
650 TABLET ORAL ONCE
Refills: 0 | Status: DISCONTINUED | OUTPATIENT
Start: 2021-08-19 | End: 2021-09-02

## 2021-08-19 RX ORDER — HYDROMORPHONE HYDROCHLORIDE 2 MG/ML
0.5 INJECTION INTRAMUSCULAR; INTRAVENOUS; SUBCUTANEOUS
Refills: 0 | Status: DISCONTINUED | OUTPATIENT
Start: 2021-08-19 | End: 2021-08-19

## 2021-08-19 RX ORDER — ONDANSETRON 8 MG/1
4 TABLET, FILM COATED ORAL ONCE
Refills: 0 | Status: DISCONTINUED | OUTPATIENT
Start: 2021-08-19 | End: 2021-09-02

## 2021-08-19 RX ORDER — MEPERIDINE HYDROCHLORIDE 50 MG/ML
12.5 INJECTION INTRAMUSCULAR; INTRAVENOUS; SUBCUTANEOUS ONCE
Refills: 0 | Status: DISCONTINUED | OUTPATIENT
Start: 2021-08-19 | End: 2021-08-19

## 2021-08-19 RX ORDER — SODIUM CHLORIDE 9 MG/ML
1000 INJECTION INTRAMUSCULAR; INTRAVENOUS; SUBCUTANEOUS
Refills: 0 | Status: DISCONTINUED | OUTPATIENT
Start: 2021-08-19 | End: 2021-09-02

## 2021-08-19 RX ADMIN — SODIUM CHLORIDE 100 MILLILITER(S): 9 INJECTION INTRAMUSCULAR; INTRAVENOUS; SUBCUTANEOUS at 09:33

## 2021-08-19 NOTE — PRE-ANESTHESIA EVALUATION ADULT - NSANTHTOBACCOSD_GEN_ALL_CORE
Render Post-Care Instructions In Note?: yes Silver Nitrate Text: The wound bed was treated with silver nitrate after the biopsy was performed. Destruction After The Procedure: No Size Of Lesion In Cm: 0.8 [FreeTextEntry1] : 18 yo girl with adhd and mood disorder with anxiety and some depression. Long standing, never been treated for the mood disorder. will start sertraline 25 mg once every evening. MUST start counseling at school or home while on the meds for me to prescribe them. Stay on the 20 mg vyvanse. call in a week with update on how she is doing. will rx 50 mg tabs and cut in half to start. call anytime for any problems. For the hair falling out will do a set of labs including tfts.  Additional Anesthesia Volume In Cc (Will Not Render If 0): 0 Consent: Written consent was obtained and risks were reviewed including but not limited to scarring, infection, bleeding, scabbing, incomplete removal, nerve damage and allergy to anesthesia. Type Of Destruction Used: Curettage Biopsy Method: Double edge Personna blades Billing Type: [employer] Biopsy Type: H and E Electrodesiccation Text: The wound bed was treated with electrodesiccation after the biopsy was performed. Detail Level: Simple Notification Instructions: Patient will be notified of biopsy results. However, patient instructed to call the office if not contacted within 2 weeks. Electrodesiccation And Curettage Text: The wound bed was treated with electrodesiccation and curettage after the biopsy was performed. No Body Location Override (Optional - Billing Will Still Be Based On Selected Body Map Location If Applicable): left medial superior chest Cryotherapy Text: The wound bed was treated with cryotherapy after the biopsy was performed. Dressing: pressure dressing with telfa Lab: [facility] Anesthesia Type: 1% lidocaine with epinephrine Wound Care: Vaseline Post-Care Instructions: I reviewed with the patient in detail post-care instructions. Patient is to keep the biopsy site dry overnight, and then apply vaseline daily until healed. Lab Facility: [facility] Anesthesia Volume In Cc (Will Not Render If 0): 0.3 Hemostasis: Drysol and Electrocautery

## 2021-08-19 NOTE — CHART NOTE - NSCHARTNOTEFT_GEN_A_CORE
PACU ANESTHESIA ADMISSION NOTE      Procedure: Right breast lumpectomy      Post op diagnosis:  Fibroadenoma, right        ____  Intubated  TV:______       Rate: ______      FiO2: ______    _x___  Patent Airway    _x___  Full return of protective reflexes    _x___  Full recovery from anesthesia / back to baseline status    Vitals:    See anesthesia record      Mental Status:  _x___ Awake   _____ Alert   _____ Drowsy   _____ Sedated    Nausea/Vomiting:  _x___  NO       ______Yes,   See Post - Op Orders         Pain Scale (0-10):  __0___    Treatment: _x___ None    ____ See Post - Op/PCA Orders    Post - Operative Fluids:   __x__ Oral   ____ See Post - Op Orders    Plan: Discharge:   _x___Home       _____Floor     _____Critical Care    _____  Other:_________________    Comments:  No anesthesia issues or complications noted.  Discharge when criteria met.

## 2021-08-19 NOTE — ASU PREOP CHECKLIST - HAND OFF
Left message for patient to return call to schedule phone visit. Patient has lab appointment scheduled on 3/19/20. Labs should be resulted by 3/20/20. Please schedule phone visit on or after 3/20/20. Kimberly Monge MA     yes

## 2021-08-20 DIAGNOSIS — N64.89 OTHER SPECIFIED DISORDERS OF BREAST: ICD-10-CM

## 2021-08-23 LAB — SURGICAL PATHOLOGY STUDY: SIGNIFICANT CHANGE UP

## 2021-08-24 DIAGNOSIS — N64.89 OTHER SPECIFIED DISORDERS OF BREAST: ICD-10-CM

## 2021-08-24 DIAGNOSIS — D24.1 BENIGN NEOPLASM OF RIGHT BREAST: ICD-10-CM

## 2021-08-24 DIAGNOSIS — D64.9 ANEMIA, UNSPECIFIED: ICD-10-CM

## 2021-08-24 DIAGNOSIS — I10 ESSENTIAL (PRIMARY) HYPERTENSION: ICD-10-CM

## 2021-09-01 ENCOUNTER — APPOINTMENT (OUTPATIENT)
Dept: BREAST CENTER | Facility: CLINIC | Age: 36
End: 2021-09-01
Payer: COMMERCIAL

## 2021-09-01 VITALS
HEIGHT: 65 IN | TEMPERATURE: 98.7 F | DIASTOLIC BLOOD PRESSURE: 74 MMHG | BODY MASS INDEX: 30.82 KG/M2 | SYSTOLIC BLOOD PRESSURE: 124 MMHG | WEIGHT: 185 LBS

## 2021-09-01 DIAGNOSIS — D24.1 BENIGN NEOPLASM OF RIGHT BREAST: ICD-10-CM

## 2021-09-01 PROCEDURE — 99024 POSTOP FOLLOW-UP VISIT: CPT

## 2021-09-01 NOTE — REASON FOR VISIT
[Post Op: _________] : a [unfilled] post op visit [FreeTextEntry1] : s/p Rt WLE with RF localization on 08/19/21

## 2021-09-01 NOTE — H&P PST ADULT - GASTROINTESTINAL

## 2021-09-01 NOTE — REVIEW OF SYSTEMS
[Fever] : no fever [Chills] : no chills [As Noted in HPI] : as noted in HPI [Skin Lesions] : no skin lesions [Skin Wound] : no skin wound [Breast Pain] : breast pain [Breast Lump] : breast lump [Negative] : Heme/Lymph

## 2021-09-01 NOTE — PHYSICAL EXAM
[Normocephalic] : normocephalic [Atraumatic] : atraumatic [EOMI] : extra ocular movement intact [No Supraclavicular Adenopathy] : no supraclavicular adenopathy [No Cervical Adenopathy] : no cervical adenopathy [Examined in the supine and seated position] : examined in the supine and seated position [Symmetrical] : symmetrical [No dominant masses] : no dominant masses left breast [No Nipple Retraction] : no left nipple retraction [No Nipple Discharge] : no left nipple discharge [No Axillary Lymphadenopathy] : no left axillary lymphadenopathy [Soft] : abdomen soft [Not Tender] : non-tender [No Edema] : no edema [No Rashes] : no rashes [No Ulceration] : no ulceration [de-identified] : surgical incision is healing well, dermabond still in place, no signs of infection, erythema or induration  [de-identified] : no suspicious mass palpated

## 2021-09-01 NOTE — HISTORY OF PRESENT ILLNESS
[FreeTextEntry1] : Yane is a 36 F with a symptomatic R breast fibroadenoma, s/p R WLE on 2021. \par \par 2021 -- R WLE \par -UIQ, small duct papilloma, small fibroadenoma \par -UIQ #2, fibroadenoma, small duct papilloma \par \par She has always had a R breast mass since 5 years prior, but starting 2 years prior, she has had worsening pain in that area. \par \par She has not palpated any abnormalities within her left breast and otherwise has cyclical breast pains. SHe denies any nipple discharge or retraction. \par \par Her work up was as follows: \par 2021 -- b/l dx mammogram and US \par -heterogenously dense breasts\par -no suspicious mass, microcalcifications or architectural distortion b/l \par b/l US \par RIGHT: \par -@8N3, stable benign oval circumscribed hypoechoic mass, measures 1.5 x 0.7 x 0.4 cm \par -@12N3-4, cysts, largest is 2.1 cm \par -@12, periareolar area, lobulated hypoechoic mass, measures 2.4 x 1.4 x 0.9 cm --> BIOPSY \par LEFT: \par -LOQ cyst, measures 0.9 cm \par -retroareolar cyst, measures 0.9 cm \par BIRADS 4\par \par 6/10/2021 -- R US CNBx @12:00, periareolar area \par -fibroadenoma, partially hyalnizing \par \par HISTORICAL RISK FACTORS: \par -no prior breast biopsies or surgeries \par -no family history of breast or ovarian cancer \par -, age at first live birth was 27 \par -no prior OCP use \par -no gyn surgeries\par \par \par INTERVAL HISTORY 21\par Yane is 36 year old female s/p Rt WLE with RF localization on 21\par \par She is still having pain at her surgical site, but denies any fevers, chills, redness or drainage.  \par Her final pathology revealed a small duct papilloma and fibroadenoma.

## 2021-09-01 NOTE — DATA REVIEWED
[FreeTextEntry1] : Surgical Final Report\par \par \par \par \par Final Diagnosis\par 1. Breast, right upper inner quadrant/retroareolar mass, radio\par frequency seed localized lumpectomy:\par - Small duct papilloma located adjacent to healing prior biopsy\par site changes.\par - Small fibroadenoma.\par - Benign fibrofatty breast tissue with proliferative type\par fibrocystic changes associated with microcalcifications.\par \par 2. Breast, right upper inner quadrant/retroareolar mass #2, radio\par frequency seed localized lumpectomy:\par - Partially hyalinizing fibroadenoma containing focal\par pseudoangiomatous stromal hyperplasia (PASH) located adjacent to\par a healing prior biopsy site.\par - Benign fibrofatty breast tissue with a small duct papilloma\par and proliferative type fibrocystic changes associated\par with microcalcifications.\par \par Verified by: Luiz Hinson M.D.\par (Electronic Signature)\par Reported on: 08/23/21 16:40 EDT, [address] [place] [address],\[facility] [place] [address]\par Phone: [phone]   Fax: [phone]\par _________________________________________________________________\par \par Clinical History\par Right breast wide local excision with radio frequency\par localization\par \par Specimen(s) Submitted\par 1     Right breast mass\par Time obtained:  8:12 am\par Time in formalin:\par 2     Right breast mass #2\par Time obtained:  8:25 am\par Time in formalin:\par \par Gross Description\par 1.  The specimen is received fresh, labeled "right breast mass\par with radio frequency localizer single anterior, double lateral,\par triple superior" and consists of a soft yellow lobulated\par fibroadipose mass weighing 2 gm and measuring 2.5 cm from\par superior to inferior, 2 cm from lateral to medial, 0.8 cm from\par anterior to posterior.  The specimen is oriented by suture as\par follows: single - anterior, double - lateral and triple -\par superior.  Specimen inked as follows: superior - blue, inferior -\par green, lateral - yellow, medial - orange, anterior - red and\par posterior - black. The specimen is\par \par \par \par \par \par LINDSEY CORDON                       2\par \par \par \par Surgical Final Report\par \par \par \par \par serially sectioned from lateral to medial. Cut surface is yellow\par to white with focal area of hemorrhage. The specimen is submitted\par entirely.\par \par Summary of Sections:\par 1A - lateral margin (perpendicular section) -1\par 1B-1E - serial sections -4\par 1F - medial margin (perpendicular section) -1\par \par Total:  6 blocks\par \par 2.   The specimen is received fresh, labeled "right breast mass\par #2 with radio frequency localizer single anterior, double\par lateral, triple superior" and consists of a soft yellow lobulated\par fibroadipose mass weighing 3 gm and measuring 2.3 cm from\par superior to inferior, 1.7 cm from lateral to medial, 1 cm from\par anterior to posterior.  The specimen is oriented by suture as\par follows: single - anterior, double - lateral and triple -\par superior.  Specimen inked as follows: superior - blue, inferior -\par green, lateral - yellow, medial - orange, anterior - red and\par posterior - black. The specimen is serially sectioned from\par superior to inferior. Cut surface is white with focal area of\par hemorrhage. The specimen is submitted entirely.\par \par Summary of Sections:\par 2A - superior margin (perpendicular section) -

## 2021-09-01 NOTE — PAST MEDICAL HISTORY
[Menstruating] : The patient is menstruating [Menarche Age ____] : age at menarche was [unfilled] [Definite ___ (Date)] : the last menstrual period was [unfilled] [Normal Amount/Duration] : it was of a normal amount and duration [Regular Cycle Intervals] : have been regular [Total Preg ___] : G[unfilled] [Live Births ___] : P[unfilled]  [Age At Live Birth ___] : Age at live birth: [unfilled] [History of Hormone Replacement Treatment] : has no history of hormone replacement treatment [FreeTextEntry5] : denies  [FreeTextEntry6] : denies [FreeTextEntry7] : denies [FreeTextEntry8] : yes x 1 year

## 2021-09-01 NOTE — ASSESSMENT
[FreeTextEntry1] : Yane is a 36 F with a symptomatic R breast fibroadenoma, s/p R WLE on 8/19/2021. \par \par 8/19/2021 -- R WLE \par -UIQ, small duct papilloma, small fibroadenoma \par -UIQ #2, fibroadenoma, small duct papilloma \par \par On exam, in her right breast, her surgical incision is healing well, dermabond still in place, no signs of infection, erythema or induration.\par \par Her final pathology revealed a small duct papilloma and fibroadenoma.  NO further surgical interventino is indicated. \par \par She will be due for a R dx mammogram and US on 2/19/2022.  This will be scheduled for her today,  I will have her follow up after for a CBE. \par \par Intraductal papillomas without atypia are considered fibroproliferative lesions without atypia.  Patients with these lesions were found to have a slightly increased relative risk of breast cancer compared to the reference population.  However the lesions themselves do not have any malignant potential. \par \par AS REVIEW: \par Her most recent imaging was a b/l dx mammogram and US on 5/24/2021 which revealed two right breast masses: -@8N3, stable benign oval circumscribed hypoechoic mass, measures 1.5 x 0.7 x 0.4 cm, @12, periareolar area, lobulated hypoechoic mass, measures 2.4 x 1.4 x 0.9 cm --> biopsy proven fibroadenoma, and b/l breast cysts. \par \par We discussed fibroadenomas.  These are benign lesions without any malignant potential.  They are hormonally influenced and can increase or decrease in size and can also regress spontaneously.  They are considered proliferative lesions without atypia.  Patients with these lesions have been found to have a slightly increased relative risk of breast cancer compared to the reference population.  Surgical excision is recommended when the diagnosis in unclear, the lesion is causing pain or breast deformity, or if it is rapidly enlarging. \par \par The reason that we recommend surgical excision for a rapidly enlarging fibroadenoma is because there is a possibility that this could be a Phyllodes Tumor.  The treatment of this lesion is wide local excision with 1 cm margins.  A sentinel lymph node would not be necessary as this disease very rarely spreads to the lymph nodes. \par \par Because she is having pain, I have recommended surgical excision of her right breast mass.  This is readily palpable, but I would still like this to be localized preoperatively. \par \par The risks and benefits of the procedure were explained including bleeding, infection, seroma or hematoma formation and possible reoperation.\par \par She is otherwise at an average risk for breast cancer and should start annual screening mammograms at the age of 38. \par \par All of her questions were answered.  She knows to call with any further questions or concerns. \par \par PLAN: \par -R dx mammogram and US 2/19/2022 \par -f/up after

## 2022-02-23 ENCOUNTER — EMERGENCY (EMERGENCY)
Facility: HOSPITAL | Age: 37
LOS: 0 days | Discharge: HOME | End: 2022-02-23
Attending: STUDENT IN AN ORGANIZED HEALTH CARE EDUCATION/TRAINING PROGRAM | Admitting: STUDENT IN AN ORGANIZED HEALTH CARE EDUCATION/TRAINING PROGRAM
Payer: MEDICAID

## 2022-02-23 VITALS
RESPIRATION RATE: 18 BRPM | HEART RATE: 75 BPM | HEIGHT: 65 IN | DIASTOLIC BLOOD PRESSURE: 85 MMHG | SYSTOLIC BLOOD PRESSURE: 146 MMHG | TEMPERATURE: 100 F | WEIGHT: 175.05 LBS | OXYGEN SATURATION: 100 %

## 2022-02-23 DIAGNOSIS — D64.9 ANEMIA, UNSPECIFIED: ICD-10-CM

## 2022-02-23 DIAGNOSIS — M54.9 DORSALGIA, UNSPECIFIED: ICD-10-CM

## 2022-02-23 DIAGNOSIS — I10 ESSENTIAL (PRIMARY) HYPERTENSION: ICD-10-CM

## 2022-02-23 PROCEDURE — 99283 EMERGENCY DEPT VISIT LOW MDM: CPT

## 2022-02-23 RX ORDER — METHOCARBAMOL 500 MG/1
1500 TABLET, FILM COATED ORAL ONCE
Refills: 0 | Status: COMPLETED | OUTPATIENT
Start: 2022-02-23 | End: 2022-02-23

## 2022-02-23 RX ORDER — IBUPROFEN 200 MG
600 TABLET ORAL ONCE
Refills: 0 | Status: COMPLETED | OUTPATIENT
Start: 2022-02-23 | End: 2022-02-23

## 2022-02-23 RX ADMIN — Medication 600 MILLIGRAM(S): at 20:12

## 2022-02-23 RX ADMIN — METHOCARBAMOL 1500 MILLIGRAM(S): 500 TABLET, FILM COATED ORAL at 20:13

## 2022-02-23 NOTE — ED PROVIDER NOTE - CLINICAL SUMMARY MEDICAL DECISION MAKING FREE TEXT BOX
36 yr old f that presents with back pain. neurovascularly intact. will provide pt with pain meds. pt to be discharged with pcp follow up and strict return precautions.

## 2022-02-23 NOTE — ED PROVIDER NOTE - PATIENT PORTAL LINK FT
You can access the FollowMyHealth Patient Portal offered by [facility] by registering at the following website: [email or web address]. By joining [facility]’s FollowMyHealth portal, you will also be able to view your health information using other applications (apps) compatible with our system.

## 2022-02-23 NOTE — ED PROVIDER NOTE - NSFOLLOWUPCLINICS_GEN_ALL_ED_FT
Neurosurgery at [facility]  Neurosurgery  [address], [address]  [place] [address]  Phone: [phone]  Fax:   Follow Up Time: 1-3 Days    [facility] ([facility])  Rehabilitation  [facility] [address], [address] [place] [address]  Phone: [phone]  Fax:   Follow Up Time: 1-3 Days    [facility] ([facility])  Rehabilitation  [address] [place] [id number]  Phone: [phone]  Fax:   Follow Up Time: 1-3 Days

## 2022-02-23 NOTE — ED PROVIDER NOTE - PHYSICAL EXAMINATION
PHYSICAL EXAM:    GENERAL: Alert, appears stated age, well appearing, non-toxic  SKIN: Warm, pink and dry. MMM. no rash  HEAD: NC, AT, no step offs   EYE: Normal lids/conjunctiva, PERRL, EOMI  ENT: Normal hearing, patent oropharynx  NECK: +supple. No meningismus, or JVD  Pulm: Bilateral BS, normal resp effort, no wheezes, stridor, or retractions  CV: RRR, no M/R/G, 2+and = radial pulses  Abd: soft, non-tender, non-distended, no hepatosplenomegaly. no CVA tenderness.   Mskel: no erythema, cyanosis, edema. no calf tenderness. no spinal TTP. +straight leg raise on L.   Neuro: AAOx3, no sensory/motor deficits. 5/5 strength throughout. normal gait.

## 2022-02-23 NOTE — ED PROVIDER NOTE - ATTENDING CONTRIBUTION TO CARE
36 yr old f w/ no pmh who presents with back pain. 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 Pt denies any fevers, IVDU, urinary/fecal incontinence, numbness or any other medical complaints.     VITAL SIGNS: I have reviewed nursing notes and confirm.  CONSTITUTIONAL: non-toxic, well appearing  SKIN: no rash, no petechiae.  EYES: EOMI, pink conjunctiva, anicteric  ENT: tongue midline, no exudates, MMM  NECK: Supple; no meningismus, no JVD  EXT: Normal ROM x4. No edema. No midline tenderness on the cervical, thoracic, lumbar, paraspinal, no step offs,   NEURO: Alert, oriented x3. CN2-12 intact, equal strength bilaterally, nl gait.  PSYCH: Cooperative, appropriate. 36 yr old f w/ no pmh who presents with back pain. 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 Pt denies any fevers, IVDU, urinary/fecal incontinence, numbness or any other medical complaints.     VITAL SIGNS: I have reviewed nursing notes and confirm.  CONSTITUTIONAL: non-toxic, well appearing  SKIN: no rash, no petechiae.  EYES: EOMI, pink conjunctiva, anicteric  ENT: tongue midline, no exudates, MMM  NECK: Supple; no meningismus, no JVD  EXT: Normal ROM x4. No edema. No midline tenderness on the cervical, thoracic, lumbar, paraspinal, no step offs,   NEURO: Alert, oriented x3. CN2-12 intact, equal strength bilaterally, nl gait.  PSYCH: Cooperative, appropriate.    a/p  36 yr old f that presents with back pain. neurovascularly intact. will provide pt with pain meds. pt to be discharged with pcp follow up and strict return precautions.

## 2022-02-23 NOTE — ED PROVIDER NOTE - PROGRESS NOTE DETAILS
DUSTY WEBB: Reviewed necessity for follow up. Counseled on red flags and to return for them.  Patient appears well on discharge.

## 2022-02-23 NOTE — ED ADULT NURSE NOTE - OBJECTIVE STATEMENT
Pt c/o back pain x several days. Pt denies n/v/d and f. Pt denies injury. Pt denies taking medication prior to arrival in ED.

## 2022-02-23 NOTE — ED PROVIDER NOTE - NS ED ROS FT
Review of Systems    Constitutional: (-) fever   Eyes/ENT: (-) vision changes  Cardiovascular: (-) chest pain, (-) syncope (-) palpitations  Respiratory: (-) cough, (-) shortness of breath  Gastrointestinal: (-) vomiting, (-) diarrhea (-)black/bloody stools (-) abdominal pain  Genitourinary:  (-) dysuria   Musculoskeletal: (-) neck pain, (+) back pain, (-) leg pain/swelling  Integumentary: (-) rash, (-) edema  Neurological: (-) headache, (-) confusion  Hematologic: (-) easy bruising

## 2022-02-23 NOTE — ED PROVIDER NOTE - OBJECTIVE STATEMENT
36-year-old female without past medical history, not on any medications, presents with moderate constant aching diffuse back pain s/p trying to pull patient onto a stretcher x 2 hrs.  She works as a PCA upstairs.  + Worse with movement, better with rest.  denies saddle anesthesia, bowel/bladder dysfunction, difficulty ambulating, paraesthesias, direct trauma, hx IVDA, recent injections, weakness, hx back surgeries, unexplained wt loss. 36-year-old female without past medical history, not on any medications, presents with moderate constant aching diffuse back pain s/p trying to pull patient onto a stretcher x 2 hrs.  She works as a PCA upstairs.  + Worse with movement, better with rest.  denies saddle anesthesia, bowel/bladder dysfunction, difficulty ambulating, paraesthesias, direct trauma, hx IVDA, recent injections, weakness, hx back surgeries, unexplained wt loss.  no fevers, urinary sxs, abd pain, cp, sob, n/v/d, or any other sxs.

## 2022-03-01 ENCOUNTER — APPOINTMENT (OUTPATIENT)
Dept: BREAST CENTER | Facility: AMBULATORY SURGERY CENTER | Age: 37
End: 2022-03-01

## 2022-03-03 ENCOUNTER — APPOINTMENT (OUTPATIENT)
Dept: NEUROSURGERY | Facility: CLINIC | Age: 37
End: 2022-03-03
Payer: MEDICAID

## 2022-03-03 VITALS — WEIGHT: 175 LBS | HEIGHT: 66 IN | BODY MASS INDEX: 28.12 KG/M2

## 2022-03-03 DIAGNOSIS — Z82.49 FAMILY HISTORY OF ISCHEMIC HEART DISEASE AND OTHER DISEASES OF THE CIRCULATORY SYSTEM: ICD-10-CM

## 2022-03-03 DIAGNOSIS — S39.012A STRAIN OF MUSCLE, FASCIA AND TENDON OF LOWER BACK, INITIAL ENCOUNTER: ICD-10-CM

## 2022-03-03 PROCEDURE — 99202 OFFICE O/P NEW SF 15 MIN: CPT

## 2022-03-04 PROBLEM — S39.012A BACK STRAIN: Status: ACTIVE | Noted: 2022-03-03

## 2022-03-04 RX ORDER — ADHESIVE TAPE 3"X 2.3 YD
50 MCG TAPE, NON-MEDICATED TOPICAL
Qty: 30 | Refills: 0 | Status: ACTIVE | COMMUNITY
Start: 2021-10-07

## 2022-03-04 RX ORDER — METHOCARBAMOL 750 MG/1
750 TABLET, FILM COATED ORAL
Qty: 14 | Refills: 0 | Status: ACTIVE | COMMUNITY
Start: 2022-02-25

## 2022-03-04 RX ORDER — IBUPROFEN 800 MG/1
800 TABLET, FILM COATED ORAL
Qty: 42 | Refills: 0 | Status: ACTIVE | COMMUNITY
Start: 2022-02-25

## 2022-03-04 RX ORDER — ACETAMINOPHEN 650 MG/1
650 TABLET, FILM COATED, EXTENDED RELEASE ORAL
Qty: 30 | Refills: 0 | Status: ACTIVE | COMMUNITY
Start: 2022-02-24

## 2022-03-04 RX ORDER — IBUPROFEN 600 MG/1
600 TABLET, FILM COATED ORAL
Qty: 20 | Refills: 0 | Status: DISCONTINUED | COMMUNITY
Start: 2021-08-19 | End: 2022-03-04

## 2022-03-04 RX ORDER — CHLORHEXIDINE GLUCONATE 4 %
325 (65 FE) LIQUID (ML) TOPICAL
Qty: 30 | Refills: 0 | Status: ACTIVE | COMMUNITY
Start: 2021-10-07

## 2022-03-04 NOTE — HISTORY OF PRESENT ILLNESS
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 THe following day, she still had persistent LBP and when to urgent care where she was prescribed NSAID and robaxin, which has helped. Today, reports her back pain has improved. Denies radicular leg pain, numbness, and tingling. Symptom is aggravated with prolong walking, and alleviated with stretches.

## 2022-03-04 NOTE — PHYSICAL EXAM
[General Appearance - In No Acute Distress] : in no acute distress [General Appearance - Alert] : alert [Person] : oriented to person [Place] : oriented to place [Time] : oriented to time [Cranial Nerves Optic (II)] : visual acuity intact bilaterally,  pupils equal round and reactive to light [Cranial Nerves Oculomotor (III)] : extraocular motion intact [Cranial Nerves Trigeminal (V)] : facial sensation intact symmetrically [Cranial Nerves Facial (VII)] : face symmetrical [Cranial Nerves Vestibulocochlear (VIII)] : hearing was intact bilaterally [Cranial Nerves Glossopharyngeal (IX)] : tongue and palate midline [Cranial Nerves Accessory (XI - Cranial And Spinal)] : head turning and shoulder shrug symmetric [Cranial Nerves Hypoglossal (XII)] : there was no tongue deviation with protrusion [Motor Tone] : muscle tone was normal in all four extremities [Motor Strength] : muscle strength was normal in all four extremities [Abnormal Walk] : normal gait [Balance] : balance was intact [2+] : Patella left 2+ [No Tenderness to Palpation] : no spine tenderness on palpation [Straight-Leg Raise Test - Left] : straight leg raise of the left leg was negative [Straight-Leg Raise Test - Right] : straight leg raise  of the right leg was negative [Normal] : normal [Able to toe walk] : the patient was able to toe walk [Able to heel walk] : the patient was able to heel walk

## 2022-03-04 NOTE — PLAN
[FreeTextEntry1] : Given Nieves Ríos improving condition, no imaging is required, though I am referring her to PT for core strength, tens, massage, stretching. She may follow up as needed. \par Letter was provided to her today to be excuse from work from March 3, 2022 through March 6, 2022. She may return to full duty on March 7, 2022.

## 2023-05-26 ENCOUNTER — EMERGENCY (EMERGENCY)
Facility: HOSPITAL | Age: 38
LOS: 0 days | Discharge: HOME | End: 2023-05-26
Attending: STUDENT IN AN ORGANIZED HEALTH CARE EDUCATION/TRAINING PROGRAM
Payer: COMMERCIAL

## 2023-05-26 VITALS
TEMPERATURE: 98 F | RESPIRATION RATE: 18 BRPM | WEIGHT: 184.97 LBS | SYSTOLIC BLOOD PRESSURE: 180 MMHG | DIASTOLIC BLOOD PRESSURE: 107 MMHG | OXYGEN SATURATION: 100 % | HEART RATE: 69 BPM

## 2023-05-26 VITALS — SYSTOLIC BLOOD PRESSURE: 154 MMHG | DIASTOLIC BLOOD PRESSURE: 89 MMHG

## 2023-05-26 DIAGNOSIS — Z86.2 PERSONAL HISTORY OF DISEASES OF THE BLOOD AND BLOOD-FORMING ORGANS AND CERTAIN DISORDERS INVOLVING THE IMMUNE MECHANISM: ICD-10-CM

## 2023-05-26 DIAGNOSIS — I10 ESSENTIAL (PRIMARY) HYPERTENSION: ICD-10-CM

## 2023-05-26 DIAGNOSIS — R51.9 HEADACHE, UNSPECIFIED: ICD-10-CM

## 2023-05-26 PROCEDURE — 99283 EMERGENCY DEPT VISIT LOW MDM: CPT

## 2023-05-26 PROCEDURE — 99282 EMERGENCY DEPT VISIT SF MDM: CPT

## 2023-05-26 NOTE — ED PROVIDER NOTE - CARE PROVIDER_API CALL
Osmani Bellamy)  Neuromuscular Medicine  [address] [place] [address], [address]  [place], [place] [id number]  Phone: [phone]  Fax: [phone]  Follow Up Time:

## 2023-05-26 NOTE — ED PROVIDER NOTE - NSFOLLOWUPINSTRUCTIONS_ED_ALL_ED_FT
Hypertension    Hypertension, commonly called high blood pressure, is when the force of blood pumping through your arteries is too strong. Hypertension forces your heart to work harder to pump blood. Your arteries may become narrow or stiff. Having untreated or uncontrolled hypertension for a long period of time can cause heart attack, stroke, kidney disease, and other problems. If started on a medication, take exactly as prescribed by your health care professional. Maintain a healthy lifestyle and follow up with your primary care physician.    SEEK IMMEDIATE MEDICAL CARE IF YOU HAVE ANY OF THE FOLLOWING SYMPTOMS: severe headache, confusion, chest pain, abdominal pain, vomiting, or shortness of breath.      Our Emergency Department Referral Coordinators will be reaching out to you in the next 24-48 hours from 9:00am to 5:00pm with a follow up appointment. Please expect a phone call from the hospital in that time frame. If you do not receive a call or if you have any questions or concerns, you can reach them at   [phone]

## 2023-05-26 NOTE — ED PROVIDER NOTE - PATIENT PORTAL LINK FT
You can access the FollowMyHealth Patient Portal offered by [facility] by registering at the following website: [email or web address]. By joining [employer]’s FollowMyHealth portal, you will also be able to view your health information using other applications (apps) compatible with our system.

## 2023-05-26 NOTE — ED ADULT NURSE NOTE - NSFALLHARMRISKINTERV_ED_ALL_ED

## 2023-05-26 NOTE — ED PROVIDER NOTE - CLINICAL SUMMARY MEDICAL DECISION MAKING FREE TEXT BOX
37 yo F w/ no pmh p/w hypertension. states that she recently started working nights and since the transition has developed generalized headaches that resolve with Excedrin. states she has checked her BP at work and noted to be elevated, SBP 180s. denies chest pain, sob, severe headache, neurological deficits. exam including a thorough neurological exam was normal. BP improved spontaneously. return precautions discussed in detail regarding signs of end organ damage in setting of BP. will give referral for PCP.

## 2023-05-26 NOTE — ED PROVIDER NOTE - OBJECTIVE STATEMENT
pt with pmhx HTN, not on meds as she has tried lifestyle modifications presents to ED c/o elev BP and HA. she admits she has been under a bit more stress transitioning to a night work schedule causing her sleep and eating patterns to be off and has not been as compliant with the lifestyle modifications she was using to control her BP previously. she takes excedrin for her HA, but as it wears off feels her HA returns worse as well. Denies fever/chill/dizziness/chest pain/palpitation/sob/abd pain/n/v/d/ black stool/bloody stool/urinary sxs

## 2023-05-26 NOTE — ED PROVIDER NOTE - PROGRESS NOTE DETAILS
Discussed with patient the risks of uncontrolled hypertension including MI, CVA, renal disease , PVD and others.  Pt was instructed to follow up with their PCP within 48 hours to re-asses blood pressure.  Pt was also instructed that if a headache, vision change, altered consciousness, severe headache, stroke like symptoms or hematuria develop to return to the Emergency Room.

## 2023-05-26 NOTE — ED PROVIDER NOTE - NS ED ATTENDING STATEMENT MOD
This was a shared visit with the DOUGLAS. I reviewed and verified the documentation and independently performed the documented:

## 2023-10-30 ENCOUNTER — OUTPATIENT (OUTPATIENT)
Dept: OUTPATIENT SERVICES | Facility: HOSPITAL | Age: 38
LOS: 1 days | End: 2023-10-30
Payer: COMMERCIAL

## 2023-10-30 DIAGNOSIS — N97.9 FEMALE INFERTILITY, UNSPECIFIED: ICD-10-CM

## 2023-10-30 DIAGNOSIS — Z00.8 ENCOUNTER FOR OTHER GENERAL EXAMINATION: ICD-10-CM

## 2023-10-30 PROCEDURE — 58340 CATHETER FOR HYSTEROGRAPHY: CPT

## 2023-10-30 PROCEDURE — 74740 X-RAY FEMALE GENITAL TRACT: CPT

## 2023-10-31 DIAGNOSIS — N97.9 FEMALE INFERTILITY, UNSPECIFIED: ICD-10-CM

## 2023-11-23 NOTE — ED PROVIDER NOTE - CARE PLAN
Patient does use pumps at home he has chronic lymphedema but discussed with the mother that edema is worsened he has been compliant with Lasix when he was in the nursing home his Lasix was 60 but decreased to 40 secondary to kidney numbers. He does have evidence of a small effusion on the left as well as the right which he has loculated see below he did receive Lasix 40 mg IV x1 on admission. which we will continue CT abdomen did not reveal any paracentesis as the mother did report that his girth has increased  proBNP is elevated  2D echo done in 2022 with mild concentric hypertrophy and EF of 60 to 65%  Am labs  Daily weights and strict I and o not documented  Patient is status post IR guided thoracentesis, after discussion is done with nephrology, will resume home dose of Lasix, hold spironolactone at this time. Due to recent epistaxis from nasal cannula prong, patient is to use facemask-patient is noncompliant. Principal Discharge DX:	Needlestick injury of finger due to non-hypodermic needle

## 2024-12-23 NOTE — H&P PST ADULT - BIRTH SEX
I called and left another message for the pt [phone] re: scheduling procedure 1-9-25 jenifer   
Female

## 2025-03-12 ENCOUNTER — EMERGENCY (EMERGENCY)
Facility: HOSPITAL | Age: 40
LOS: 0 days | Discharge: HOME | End: 2025-03-12
Attending: EMERGENCY MEDICINE
Payer: COMMERCIAL

## 2025-03-12 VITALS
RESPIRATION RATE: 17 BRPM | DIASTOLIC BLOOD PRESSURE: 92 MMHG | WEIGHT: 186.07 LBS | HEART RATE: 66 BPM | SYSTOLIC BLOOD PRESSURE: 132 MMHG | HEIGHT: 65 IN | TEMPERATURE: 99 F | OXYGEN SATURATION: 99 %
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
## 2025-03-12 DIAGNOSIS — S63.602A UNSPECIFIED SPRAIN OF LEFT THUMB, INITIAL ENCOUNTER: ICD-10-CM
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
## 2025-03-12 PROCEDURE — 99284 EMERGENCY DEPT VISIT MOD MDM: CPT

## 2025-03-12 PROCEDURE — 73130 X-RAY EXAM OF HAND: CPT | Mod: LT

## 2025-03-12 PROCEDURE — 99283 EMERGENCY DEPT VISIT LOW MDM: CPT | Mod: 25

## 2025-03-12 PROCEDURE — 73130 X-RAY EXAM OF HAND: CPT | Mod: 26,LT

## 2025-03-12 NOTE — ED PROVIDER NOTE - PATIENT PORTAL LINK FT
You can access the FollowMyHealth Patient Portal offered by [facility] by registering at the following website: [email or web address]. By joining [facility]’s FollowMyHealth portal, you will also be able to view your health information using other applications (apps) compatible with our system.

## 2025-03-12 NOTE — ED ADULT NURSE NOTE - CAS TRG GEN SKIN CONDITION
[FreeTextEntry1] : wellness exam.\par establish care. [de-identified] : Ms. DILLON PAEZ  is    72 year female . \par h/o ovariacn CA , s/p surgey and chemo.\par on lexapro 20 mg depression , feels better with medication.\par Pt. is over all feeling well.\par No change is bowel and bladder habit.\par No trouble sleeping at night.\par \par  Warm

## 2025-03-12 NOTE — ED PROVIDER NOTE - OBJECTIVE STATEMENT
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  The patient then grabbed her thumb and twisted it.  Patient has pain to the left thumb with pain on range of motion.

## 2025-03-12 NOTE — ED PROVIDER NOTE - WHICH SHOWED
ED [facility] XR film, my independent interpretation - Dr. Nallely Funez, attending physician:  No fractures, no dislocations, or foreign body appreciated

## 2025-03-12 NOTE — ED ADULT TRIAGE NOTE - CHIEF COMPLAINT QUOTE
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

## 2025-03-12 NOTE — ED PROVIDER NOTE - ATTENDING APP SHARED VISIT CONTRIBUTION OF CARE
40 yo f with no reported pmh presents with L thumb pain.  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  exam: L thumb with pain at the base, FROM, no significant swelling imp: pt with L thumb sprain, will xry